# Patient Record
Sex: MALE | Race: WHITE | URBAN - METROPOLITAN AREA
[De-identification: names, ages, dates, MRNs, and addresses within clinical notes are randomized per-mention and may not be internally consistent; named-entity substitution may affect disease eponyms.]

---
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
## 2024-01-02 DIAGNOSIS — F31.9 BIPOLAR 1 DISORDER (HCC): ICD-10-CM

## 2024-01-02 RX ORDER — RISPERIDONE 1 MG/1
TABLET ORAL
Refills: 0 | OUTPATIENT
Start: 2024-01-02

## 2024-01-02 NOTE — TELEPHONE ENCOUNTER
Risperidone  Last refilled 12/12/23 #30/0 refills    Iron  Listed as historical    Last OV 12/9/23

## 2024-01-04 ENCOUNTER — TELEPHONE (OUTPATIENT)
Dept: FAMILY MEDICINE CLINIC | Facility: CLINIC | Age: 19
End: 2024-01-04

## 2024-01-04 DIAGNOSIS — F31.9 BIPOLAR 1 DISORDER (HCC): ICD-10-CM

## 2024-01-04 RX ORDER — RISPERIDONE 1 MG/1
TABLET ORAL
Qty: 30 TABLET | Refills: 0 | Status: SHIPPED | OUTPATIENT
Start: 2024-01-04

## 2024-01-04 NOTE — TELEPHONE ENCOUNTER
Chema is at work and very noisy, difficult hearing him.  He will call back on Saturday and will schedule f/u appt with Dr Freeman.  Dr Freeman filled prescription.

## 2024-01-04 NOTE — TELEPHONE ENCOUNTER
Last refill: 12/13/23  qtY: 30  W/ 0 refills  Last ov: 12/09/23    Requested Prescriptions     Pending Prescriptions Disp Refills    risperiDONE 1 MG Oral Tab [Pharmacy Med Name: RISPERIDONE 1MG TABLETS] 30 tablet 0     Sig: TAKE 1 TABLET(1 MG) BY MOUTH TWICE DAILY     No future appointments.

## 2024-01-04 NOTE — TELEPHONE ENCOUNTER
There is a refill request for risperiDONE 1 MG Oral Tab already in system.  Pt is out of meds.  This was requested today & 2 days ago.  Pharmacy:  [facility]

## 2024-01-26 DIAGNOSIS — F31.9 BIPOLAR 1 DISORDER (HCC): ICD-10-CM

## 2024-01-26 RX ORDER — RISPERIDONE 1 MG/1
TABLET ORAL
Qty: 30 TABLET | Refills: 0 | OUTPATIENT
Start: 2024-01-26

## 2024-01-26 NOTE — TELEPHONE ENCOUNTER
NO PROTOCOL    OV 12/09/23  REFILL 01/04 #30    No future appointments.    -[facility] SENT - PT NOTIFIED PCP NOT IN OFFICE UNTIL MONDAY.

## 2024-01-27 RX ORDER — RISPERIDONE 1 MG/1
TABLET ORAL
Qty: 30 TABLET | Refills: 0 | Status: SHIPPED | OUTPATIENT
Start: 2024-01-27

## 2024-02-17 DIAGNOSIS — F31.9 BIPOLAR 1 DISORDER (HCC): ICD-10-CM

## 2024-02-17 RX ORDER — RISPERIDONE 1 MG/1
TABLET ORAL
Qty: 30 TABLET | Refills: 0 | Status: SHIPPED | OUTPATIENT
Start: 2024-02-17

## 2024-02-17 NOTE — TELEPHONE ENCOUNTER
Last refill: 01/27/24  Qty: 30  W/ 0 refills  Last ov: 12/09/23    Requested Prescriptions     Pending Prescriptions Disp Refills    risperiDONE 1 MG Oral Tab 30 tablet 0     Sig: TAKE 1 TABLET(1 MG) BY MOUTH TWICE DAILY     No future appointments.

## 2024-03-09 DIAGNOSIS — F31.9 BIPOLAR 1 DISORDER (HCC): ICD-10-CM

## 2024-03-11 RX ORDER — RISPERIDONE 1 MG/1
TABLET ORAL
Qty: 30 TABLET | Refills: 0 | Status: SHIPPED | OUTPATIENT
Start: 2024-03-11

## 2024-03-11 NOTE — TELEPHONE ENCOUNTER
Requested Prescriptions     Pending Prescriptions Disp Refills    risperiDONE 1 MG Oral Tab 30 tablet 0     Sig: TAKE 1 TABLET(1 MG) BY MOUTH TWICE DAILY     Last refill 2/17/24 #30  LOV 12/9/23  No future appointments.

## 2024-04-06 DIAGNOSIS — F31.9 BIPOLAR 1 DISORDER (HCC): ICD-10-CM

## 2024-04-07 ENCOUNTER — PATIENT MESSAGE (OUTPATIENT)
Dept: FAMILY MEDICINE CLINIC | Facility: CLINIC | Age: 19
End: 2024-04-07

## 2024-04-08 RX ORDER — RISPERIDONE 1 MG/1
TABLET ORAL
Qty: 30 TABLET | Refills: 0 | Status: SHIPPED | OUTPATIENT
Start: 2024-04-08

## 2024-04-08 NOTE — TELEPHONE ENCOUNTER
Requested Prescriptions     Pending Prescriptions Disp Refills    risperiDONE 1 MG Oral Tab 30 tablet 0     Sig: TAKE 1 TABLET(1 MG) BY MOUTH TWICE DAILY     Last refill 3/11/24 #30  LOV 12/9/23  No future appointments.

## 2024-04-08 NOTE — TELEPHONE ENCOUNTER
From: Chema Danielle  To: Godfrey Freeman  Sent: 4/7/2024 11:49 PM CDT  Subject: Meds    I am out of my medication I don't think I requested them soon enough.

## 2024-04-27 DIAGNOSIS — F31.9 BIPOLAR 1 DISORDER (HCC): ICD-10-CM

## 2024-04-29 RX ORDER — RISPERIDONE 1 MG/1
TABLET ORAL
Qty: 30 TABLET | Refills: 0 | Status: SHIPPED | OUTPATIENT
Start: 2024-04-29

## 2024-04-29 NOTE — TELEPHONE ENCOUNTER
Last refill: 04/08/24  Qty: 30  W/ 0 refills  Last ov: 12/09/23    Requested Prescriptions     Pending Prescriptions Disp Refills    risperiDONE 1 MG Oral Tab 30 tablet 0     Sig: TAKE 1 TABLET(1 MG) BY MOUTH TWICE DAILY     No future appointments.

## 2024-05-19 DIAGNOSIS — F31.9 BIPOLAR 1 DISORDER (HCC): ICD-10-CM

## 2024-05-20 RX ORDER — RISPERIDONE 1 MG/1
TABLET ORAL
Qty: 30 TABLET | Refills: 0 | Status: SHIPPED | OUTPATIENT
Start: 2024-05-20

## 2024-05-20 NOTE — TELEPHONE ENCOUNTER
Last refill: 04/29/24  qtY: 30  W/ 0 refills  Last ov: 12/09/23    Requested Prescriptions     Pending Prescriptions Disp Refills    risperiDONE 1 MG Oral Tab 30 tablet 0     Sig: TAKE 1 TABLET(1 MG) BY MOUTH TWICE DAILY     No future appointments.

## 2024-06-13 DIAGNOSIS — F31.9 BIPOLAR 1 DISORDER (HCC): ICD-10-CM

## 2024-06-13 RX ORDER — RISPERIDONE 1 MG/1
TABLET ORAL
Qty: 30 TABLET | Refills: 0 | Status: SHIPPED | OUTPATIENT
Start: 2024-06-13

## 2024-06-13 NOTE — TELEPHONE ENCOUNTER
Last refill: 05/20/24  qtY: 30  W/ 0 refills  Last ov: 12/09/23    Requested Prescriptions     Pending Prescriptions Disp Refills    risperiDONE 1 MG Oral Tab 30 tablet 0     Sig: TAKE 1 TABLET(1 MG) BY MOUTH TWICE DAILY     No future appointments.

## 2024-07-03 DIAGNOSIS — F31.9 BIPOLAR 1 DISORDER (HCC): ICD-10-CM

## 2024-07-03 RX ORDER — RISPERIDONE 1 MG/1
TABLET ORAL
Qty: 30 TABLET | Refills: 0 | OUTPATIENT
Start: 2024-07-03

## 2024-07-03 NOTE — TELEPHONE ENCOUNTER
risperiDONE 1 MG Oral Tab   Last office visit:12/9/23  No future appointments.  Last filled:  6/13/24   #30   Last labs:

## 2024-07-08 DIAGNOSIS — F31.9 BIPOLAR 1 DISORDER (HCC): ICD-10-CM

## 2024-07-08 RX ORDER — RISPERIDONE 1 MG/1
TABLET ORAL
Qty: 30 TABLET | Refills: 0 | Status: SHIPPED | OUTPATIENT
Start: 2024-07-08

## 2024-07-08 NOTE — TELEPHONE ENCOUNTER
Requested Prescriptions     Pending Prescriptions Disp Refills    risperiDONE 1 MG Oral Tab 30 tablet 0     Sig: TAKE 1 TABLET(1 MG) BY MOUTH TWICE DAILY     Last refill 6/13/24 #30  LOV 12/9/23  Future Appointments   Date Time Provider Department Center   7/20/2024 10:30 AM Godfrey Freeman DO [facility] [place]

## 2024-07-29 DIAGNOSIS — F31.9 BIPOLAR 1 DISORDER (HCC): ICD-10-CM

## 2024-07-29 RX ORDER — RISPERIDONE 1 MG/1
TABLET ORAL
Qty: 30 TABLET | Refills: 0 | Status: SHIPPED | OUTPATIENT
Start: 2024-07-29

## 2024-07-29 NOTE — TELEPHONE ENCOUNTER
No protocol    OV 12/09/23  REFILL 07/08/24 #30    Future Appointments   Date Time Provider Department Center   8/3/2024 11:00 AM Godfrey Freeman,  [facility] [place]

## 2024-08-25 DIAGNOSIS — F31.9 BIPOLAR 1 DISORDER (HCC): ICD-10-CM

## 2024-08-26 RX ORDER — RISPERIDONE 1 MG/1
TABLET ORAL
Qty: 30 TABLET | Refills: 0 | Status: SHIPPED | OUTPATIENT
Start: 2024-08-26

## 2024-08-26 NOTE — TELEPHONE ENCOUNTER
risperiDONE 1 MG Oral Tab   Last office visit:  12/9/23  Future Appointments   Date Time Provider Department Center   9/14/2024 11:30 AM Godfrey Freeman,  [facility] [place]   Last filled:  7/29/24  #30   Last labs:

## 2024-09-12 DIAGNOSIS — F31.9 BIPOLAR 1 DISORDER (HCC): ICD-10-CM

## 2024-09-12 RX ORDER — RISPERIDONE 1 MG/1
TABLET ORAL
Qty: 30 TABLET | Refills: 0 | Status: SHIPPED | OUTPATIENT
Start: 2024-09-12
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
## 2024-10-06 DIAGNOSIS — F31.9 BIPOLAR 1 DISORDER (HCC): ICD-10-CM

## 2024-10-07 RX ORDER — RISPERIDONE 1 MG/1
TABLET ORAL
Qty: 30 TABLET | Refills: 0 | OUTPATIENT
Start: 2024-10-07

## 2024-10-07 NOTE — TELEPHONE ENCOUNTER
Last refill: 09/12/24  Qty: 30  W/ 0 refills  Last ov: 12/09/23    Requested Prescriptions     Pending Prescriptions Disp Refills    risperiDONE 1 MG Oral Tab 30 tablet 0     Sig: TAKE 1 TABLET(1 MG) BY MOUTH TWICE DAILY     Future Appointments   Date Time Provider Department Center   10/12/2024 11:30 AM Godfrey Freeman DO [facility] [place]

## 2024-10-12 ENCOUNTER — TELEPHONE (OUTPATIENT)
Dept: FAMILY MEDICINE CLINIC | Facility: CLINIC | Age: 19
End: 2024-10-12

## 2024-10-12 DIAGNOSIS — F31.9 BIPOLAR 1 DISORDER (HCC): ICD-10-CM

## 2024-10-14 RX ORDER — RISPERIDONE 1 MG/1
TABLET ORAL
Qty: 30 TABLET | Refills: 0 | OUTPATIENT
Start: 2024-10-14

## 2024-10-14 NOTE — TELEPHONE ENCOUNTER
risperiDONE 1 MG Oral Tab   Last office visit:  12/9/23  No future appointments.  Last filled:  9/12/24  #30   Last labs:

## 2025-04-29 ENCOUNTER — TELEPHONE (OUTPATIENT)
Dept: FAMILY MEDICINE CLINIC | Facility: CLINIC | Age: 20
End: 2025-04-29

## 2025-04-29 NOTE — TELEPHONE ENCOUNTER
Called and left message regarding Dr Freeman being retired and offering guidance if they want to stay with [facility]. I will mail letter with provider information.

## (undated) NOTE — LETTER
Date: 2023    Patient Name: Shawanda Vogel                           -2005          To Whom it may concern: This letter has been written at the patient's request. The above patient is under my care at the [facility] for treatment of testing positive for Covid on 2023    This patient should be excused from attending work through 2023. The patient may return to work on Monday, 2023.     Sincerely,        Janet Pulido DO

## (undated) NOTE — LETTER
[facility], [place]  [address] [address]  PH: [phone]  FAX: [phone]            04/29/25      Chema Danielle  [address] [address]     Regarding:  Dr. Freeman alf      Dear Patient,      This is a follow up letter regarding Dr. Freeman's [facility] from primary care.    We are requesting that you schedule an appointment to establish care with your new primary care physician for your continued health needs.      The following [facility] physicians in the [place] and [place] offices are accepting new patients:  Dr. Sherry Gill - [phone] ([place])  Dr. Stephany Bhatia - [phone] ([place])  Dr. Bridger Rodriguez - [phone] ([place])  Dr. Ivory Weller - [phone] ([place])    Sincerely,       [facility]

## (undated) NOTE — LETTER
[facility] [employer] [facility] [employer] of Child Health Examination       Student's Name  PPTV Birth Date Signature                                                                                                                                              Title                           Date    (If adding dates to the above immunization history section, put y Penicillins MEDICATION  (List all prescribed or taken on a regular basis.)  No current outpatient prescriptions on file. Diagnosis of asthma?   Child wakes during the night coughing   Yes   No    Yes   No    Loss of function of one of paired organs? (eye/ DIABETES SCREENING  BMI>85% age/sex  No And any two of the following:  Family History No    Ethnic Minority  No          Signs of Insulin Resistance (hypertension, dyslipidemia, polycystic ovarian syndrome, acanthosis nigricans)    No           At Risk  No Quick-relief  medication (e.g. Short Acting Beta Antagonist): No          Controller medication (e.g. inhaled corticosteroid):   No Other   NEEDS/MODIFICATIONS required in the school setting  None DIETARY Needs/Restrictions     None   SPECIAL INSTR